# Patient Record
Sex: FEMALE | Race: OTHER
[De-identification: names, ages, dates, MRNs, and addresses within clinical notes are randomized per-mention and may not be internally consistent; named-entity substitution may affect disease eponyms.]

---
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
## 2020-01-07 ENCOUNTER — HOSPITAL ENCOUNTER (EMERGENCY)
Dept: HOSPITAL 61 - ER | Age: 20
Discharge: HOME | End: 2020-01-07
Payer: MEDICAID

## 2020-01-07 VITALS — WEIGHT: 183 LBS | HEIGHT: 64 IN | BODY MASS INDEX: 31.24 KG/M2

## 2020-01-07 VITALS — DIASTOLIC BLOOD PRESSURE: 56 MMHG | SYSTOLIC BLOOD PRESSURE: 117 MMHG

## 2020-01-07 DIAGNOSIS — R07.1: ICD-10-CM

## 2020-01-07 DIAGNOSIS — R11.0: ICD-10-CM

## 2020-01-07 DIAGNOSIS — R51: Primary | ICD-10-CM

## 2020-01-07 LAB
ALBUMIN SERPL-MCNC: 3 G/DL (ref 3.4–5)
ALBUMIN/GLOB SERPL: 0.7 {RATIO} (ref 1–1.7)
ALP SERPL-CCNC: 128 U/L (ref 46–116)
ALT SERPL-CCNC: 19 U/L (ref 14–59)
ANION GAP SERPL CALC-SCNC: 11 MMOL/L (ref 6–14)
APTT PPP: YELLOW S
AST SERPL-CCNC: 18 U/L (ref 15–37)
BACTERIA #/AREA URNS HPF: (no result) /HPF
BASOPHILS # BLD AUTO: 0 X10^3/UL (ref 0–0.2)
BASOPHILS NFR BLD: 0 % (ref 0–3)
BILIRUB SERPL-MCNC: 0.3 MG/DL (ref 0.2–1)
BILIRUB UR QL STRIP: NEGATIVE
BUN SERPL-MCNC: 19 MG/DL (ref 7–20)
BUN/CREAT SERPL: 27 (ref 6–20)
CALCIUM SERPL-MCNC: 9.2 MG/DL (ref 8.5–10.1)
CHLORIDE SERPL-SCNC: 103 MMOL/L (ref 98–107)
CO2 SERPL-SCNC: 24 MMOL/L (ref 21–32)
CREAT SERPL-MCNC: 0.7 MG/DL (ref 0.6–1)
EOSINOPHIL NFR BLD: 0.1 X10^3/UL (ref 0–0.7)
EOSINOPHIL NFR BLD: 1 % (ref 0–3)
ERYTHROCYTE [DISTWIDTH] IN BLOOD BY AUTOMATED COUNT: 15.8 % (ref 11.5–14.5)
FIBRINOGEN PPP-MCNC: CLEAR MG/DL
GFR SERPLBLD BASED ON 1.73 SQ M-ARVRAT: 107.8 ML/MIN
GLOBULIN SER-MCNC: 4.4 G/DL (ref 2.2–3.8)
GLUCOSE SERPL-MCNC: 103 MG/DL (ref 70–99)
HCT VFR BLD CALC: 39.9 % (ref 36–47)
HGB BLD-MCNC: 13.4 G/DL (ref 12–15.5)
LYMPHOCYTES # BLD: 2.6 X10^3/UL (ref 1–4.8)
LYMPHOCYTES NFR BLD AUTO: 31 % (ref 24–48)
MCH RBC QN AUTO: 31 PG (ref 25–35)
MCHC RBC AUTO-ENTMCNC: 34 G/DL (ref 31–37)
MCV RBC AUTO: 91 FL (ref 79–100)
MONO #: 0.5 X10^3/UL (ref 0–1.1)
MONOCYTES NFR BLD: 6 % (ref 0–9)
NEUT #: 5.1 X10^3/UL (ref 1.8–7.7)
NEUTROPHILS NFR BLD AUTO: 61 % (ref 31–73)
NITRITE UR QL STRIP: NEGATIVE
PH UR STRIP: 6.5 [PH]
PLATELET # BLD AUTO: 370 X10^3/UL (ref 140–400)
POTASSIUM SERPL-SCNC: 3.7 MMOL/L (ref 3.5–5.1)
PROT SERPL-MCNC: 7.4 G/DL (ref 6.4–8.2)
PROT UR STRIP-MCNC: NEGATIVE MG/DL
PROTHROMBIN TIME: 12.4 SEC (ref 11.7–14)
RBC # BLD AUTO: 4.4 X10^6/UL (ref 3.5–5.4)
RBC #/AREA URNS HPF: (no result) /HPF (ref 0–2)
SODIUM SERPL-SCNC: 138 MMOL/L (ref 136–145)
SQUAMOUS #/AREA URNS LPF: (no result) /LPF
UROBILINOGEN UR-MCNC: 0.2 MG/DL
WBC # BLD AUTO: 8.3 X10^3/UL (ref 4–11)
WBC #/AREA URNS HPF: (no result) /HPF (ref 0–4)

## 2020-01-07 PROCEDURE — 81001 URINALYSIS AUTO W/SCOPE: CPT

## 2020-01-07 PROCEDURE — 85025 COMPLETE CBC W/AUTO DIFF WBC: CPT

## 2020-01-07 PROCEDURE — 70450 CT HEAD/BRAIN W/O DYE: CPT

## 2020-01-07 PROCEDURE — 93005 ELECTROCARDIOGRAM TRACING: CPT

## 2020-01-07 PROCEDURE — 71045 X-RAY EXAM CHEST 1 VIEW: CPT

## 2020-01-07 PROCEDURE — 85610 PROTHROMBIN TIME: CPT

## 2020-01-07 PROCEDURE — 36415 COLL VENOUS BLD VENIPUNCTURE: CPT

## 2020-01-07 PROCEDURE — 80053 COMPREHEN METABOLIC PANEL: CPT

## 2020-01-07 PROCEDURE — 83880 ASSAY OF NATRIURETIC PEPTIDE: CPT

## 2020-01-07 PROCEDURE — 99285 EMERGENCY DEPT VISIT HI MDM: CPT

## 2020-01-07 PROCEDURE — 96374 THER/PROPH/DIAG INJ IV PUSH: CPT

## 2020-01-07 PROCEDURE — 87086 URINE CULTURE/COLONY COUNT: CPT

## 2020-01-07 PROCEDURE — 96375 TX/PRO/DX INJ NEW DRUG ADDON: CPT

## 2020-01-07 RX ADMIN — ONDANSETRON ONE MG: 2 INJECTION INTRAMUSCULAR; INTRAVENOUS at 05:53

## 2020-01-07 RX ADMIN — KETOROLAC TROMETHAMINE ONE MG: 30 INJECTION, SOLUTION INTRAMUSCULAR at 06:13

## 2020-01-07 RX ADMIN — BACITRACIN SCH MLS/HR: 5000 INJECTION, POWDER, FOR SOLUTION INTRAMUSCULAR at 05:26

## 2020-01-07 NOTE — RAD
AP chest x-ray

 

HISTORY: Chest pain with deep inspiration.

 

FINDINGS: Heart size upper limits of normal may be magnified by the AP 

portable technique. Mediastinal silhouette is normal. The far superior 

lung apices are outside the field-of-view. No pneumothorax, pulmonary 

opacities or pleural effusions. The bones are unremarkable.

 

IMPRESSION: No acute process.

 

Electronically signed by: Ryan Judd MD (1/7/2020 6:08 AM) [facility]

## 2020-01-07 NOTE — EKG
[facility]

              [address] [place] [address]

Test Date:    2020               Test Time:    05:12:07

Pat Name:     SAVNANA COLEMAN    Department:   

Patient ID:   [id number]           Room:          

Gender:       F                        Technician:   

:          2000               Requested By: CLARK GODDARD

Order Number: [id number]           Reading MD:     

                                 Measurements

Intervals                              Axis          

Rate:         69                       P:            44

LA:           122                      QRS:          58

QRSD:         82                       T:            34

QT:           398                                    

QTc:          428                                    

                           Interpretive Statements

SINUS RHYTHM

LEFT ATRIAL ABNORMALITY

INCOMPLETE RIGHT BUNDLE BRANCH BLOCK

ABNORMAL ECG

RI6.01

No previous ECG available for comparison

## 2020-01-07 NOTE — PHYS DOC
Past Medical History


Past Medical History:  Other


Additional Past Medical Histor:  preclampsia


 (CLARK GODDARD MD)


Past Surgical History:  No Surgical History


 (CLARK GODDARD MD)


Alcohol Use:  None


Drug Use:  None


 (CLARK GODDARD MD)





Adult General


Chief Complaint


Chief Complaint:  MULTIPLE COMPLAINTS





HPI


HPI





19-year-old female with delivery on  secondary to preeclampsia, presents to

the emergency department with complaints of headache, chest pain. She states 

pain is worse with deep breath. She states her headache is bitemporal, no visual

disturbance, she has had nausea. States she's tried Motrin over-the-counter 

without improvement. Blood pressure currently 133/68, heart rate 69, afebrile.  

She is currently on no medications at this time. She states given her pain she 

continued to the emergency department for further evaluation. EKG is obtained 0 

512 with no acute abnormality seen, heart rate 69. Nothing is improved her pain.


 (CLARK GODDARD MD)





Review of Systems


Review of Systems





Constitutional: Denies fever or chills []


Respiratory: Denies cough or shortness of breath []


Cardiovascular: No additional information not addressed in HPI []


GI: Denies abdominal pain, + nausea, no vomiting, bloody stools or diarrhea []


: Denies dysuria or hematuria []


Musculoskeletal: Denies back pain or joint pain []


Integument: Denies rash or skin lesions []


Neurologic: + headache, no focal weakness or sensory changes []








All other systems were reviewed and found to be within normal limits, except as 

documented in this note.


 (CLARK GODDARD MD)





Current Medications


Current Medications





Current Medications








 Medications


  (Trade)  Dose


 Ordered  Sig/Franci  Start Time


 Stop Time Status Last Admin


Dose Admin


 


 Diphenhydramine


 HCl


  (Benadryl)  25 mg  1X  ONCE  20 06:00


 20 06:01 DC 20 05:53


25 MG


 


 Ketorolac


 Tromethamine


  (Toradol 30mg


 Vial)  30 mg  1X  ONCE  20 06:30


 20 06:31 DC 20 06:13


30 MG


 


 Ondansetron HCl


  (Zofran)  4 mg  1X  ONCE  20 06:00


 20 06:01 DC 20 05:53


4 MG


 


 Sodium Chloride  1,000 ml @ 


 1,000 mls/hr  Q1H  20 05:30


 20 06:29 DC 20 05:26


1,000 MLS/HR





 (STEPHANY SALAZAR MD)





Allergies


Allergies





Allergies








Coded Allergies Type Severity Reaction Last Updated Verified


 


  No Known Drug Allergies    19 No





 (STEPHANY SALAZAR MD)





Physical Exam


Physical Exam





Constitutional: Well developed, well nourished, no acute distress, non-toxic 

appearance. []


HENT: Normocephalic, atraumatic, bilateral external ears normal, oropharynx 

moist, no oral exudates, nose normal. []


Eyes: PERRLA, EOMI, conjunctiva normal, no discharge. [] 


Cardiovascular:Heart rate regular rhythm, no murmur []


Lungs & Thorax:  Bilateral breath sounds clear to auscultation []


Abdomen: Bowel sounds normal, soft, no tenderness, no masses, no pulsatile 

masses. [] 


Skin: Warm, dry, no erythema, no rash. [] 


Back: No tenderness, no CVA tenderness. [] 


Extremities: No tenderness, no edema. [] 


Neurologic: Alert and oriented X 3, no focal deficits noted. []


Psychologic: Affect normal, judgement normal, mood normal. []


 (CLARK GODDARD MD)





Current Patient Data


Vital Signs





                                   Vital Signs








  Date Time  Temp Pulse Resp B/P (MAP) Pulse Ox O2 Delivery O2 Flow Rate FiO2


 


20 06:09  55 20 125/74 (91) 98 Room Air  


 


20 05:07 98.8       





 98.8       





 (STEPHANY SALAZAR MD)


Lab Values





                                Laboratory Tests








Test


 20


05:15 20


05:20


 


Urine Color Yellow   


 


Urine Clarity Clear   


 


Urine pH 6.5   


 


Urine Specific Gravity 1.015   


 


Urine Protein


 Negative mg/dL


(NEG-TRACE) 





 


Urine Glucose (UA)


 Negative mg/dL


(NEG) 





 


Urine Ketones (Stick)


 Negative mg/dL


(NEG) 





 


Urine Blood Large (NEG)   


 


Urine Nitrite


 Negative (NEG)


 





 


Urine Bilirubin


 Negative (NEG)


 





 


Urine Urobilinogen Dipstick


 0.2 mg/dL (0.2


mg/dL) 





 


Urine Leukocyte Esterase Large (NEG)   


 


Urine RBC


 6-10 /HPF


(0-2) 





 


Urine WBC


 5-10 /HPF


(0-4) 





 


Urine Squamous Epithelial


Cells None /LPF  


 





 


Urine Bacteria


 Few /HPF


(0-FEW) 





 


White Blood Count


 


 8.3 x10^3/uL


(4.0-11.0)


 


Red Blood Count


 


 4.40 x10^6/uL


(3.50-5.40)


 


Hemoglobin


 


 13.4 g/dL


(12.0-15.5)


 


Hematocrit


 


 39.9 %


(36.0-47.0)


 


Mean Corpuscular Volume


 


 91 fL ()





 


Mean Corpuscular Hemoglobin  31 pg (25-35)  


 


Mean Corpuscular Hemoglobin


Concent 


 34 g/dL


(31-37)


 


Red Cell Distribution Width


 


 15.8 %


(11.5-14.5)  H


 


Platelet Count


 


 370 x10^3/uL


(140-400)


 


Neutrophils (%) (Auto)  61 % (31-73)  


 


Lymphocytes (%) (Auto)  31 % (24-48)  


 


Monocytes (%) (Auto)  6 % (0-9)  


 


Eosinophils (%) (Auto)  1 % (0-3)  


 


Basophils (%) (Auto)  0 % (0-3)  


 


Neutrophils # (Auto)


 


 5.1 x10^3/uL


(1.8-7.7)


 


Lymphocytes # (Auto)


 


 2.6 x10^3/uL


(1.0-4.8)


 


Monocytes # (Auto)


 


 0.5 x10^3/uL


(0.0-1.1)


 


Eosinophils # (Auto)


 


 0.1 x10^3/uL


(0.0-0.7)


 


Basophils # (Auto)


 


 0.0 x10^3/uL


(0.0-0.2)


 


Prothrombin Time


 


 12.4 SEC


(11.7-14.0)


 


Prothrombin Time INR  1.0 (0.8-1.1)  


 


Sodium Level


 


 138 mmol/L


(136-145)


 


Potassium Level


 


 3.7 mmol/L


(3.5-5.1)


 


Chloride Level


 


 103 mmol/L


()


 


Carbon Dioxide Level


 


 24 mmol/L


(21-32)


 


Anion Gap  11 (6-14)  


 


Blood Urea Nitrogen


 


 19 mg/dL


(7-20)


 


Creatinine


 


 0.7 mg/dL


(0.6-1.0)


 


Estimated GFR


(Cockcroft-Gault) 


 107.8  





 


BUN/Creatinine Ratio  27 (6-20)  H


 


Glucose Level


 


 103 mg/dL


(70-99)  H


 


Calcium Level


 


 9.2 mg/dL


(8.5-10.1)


 


Total Bilirubin


 


 0.3 mg/dL


(0.2-1.0)


 


Aspartate Amino Transferase


(AST) 


 18 U/L (15-37)





 


Alanine Aminotransferase (ALT)


 


 19 U/L (14-59)





 


Alkaline Phosphatase


 


 128 U/L


()  H


 


NT-Pro-B-Type Natriuretic


Peptide 


 19 pg/mL


(0-124)


 


Total Protein


 


 7.4 g/dL


(6.4-8.2)


 


Albumin


 


 3.0 g/dL


(3.4-5.0)  L


 


Albumin/Globulin Ratio


 


 0.7 (1.0-1.7)


L





                                Laboratory Tests


20 05:20








                                Laboratory Tests


20 05:20








 (STEPHANY SALAZAR MD)


Lab Values





                                Laboratory Tests








Test


 20


05:20


 


White Blood Count


 8.3 x10^3/uL


(4.0-11.0)


 


Red Blood Count


 4.40 x10^6/uL


(3.50-5.40)


 


Hemoglobin


 13.4 g/dL


(12.0-15.5)


 


Hematocrit


 39.9 %


(36.0-47.0)


 


Mean Corpuscular Volume


 91 fL ()





 


Mean Corpuscular Hemoglobin 31 pg (25-35)  


 


Mean Corpuscular Hemoglobin


Concent 34 g/dL


(31-37)


 


Red Cell Distribution Width


 15.8 %


(11.5-14.5)  H


 


Platelet Count


 370 x10^3/uL


(140-400)


 


Neutrophils (%) (Auto) 61 % (31-73)  


 


Lymphocytes (%) (Auto) 31 % (24-48)  


 


Monocytes (%) (Auto) 6 % (0-9)  


 


Eosinophils (%) (Auto) 1 % (0-3)  


 


Basophils (%) (Auto) 0 % (0-3)  


 


Neutrophils # (Auto)


 5.1 x10^3/uL


(1.8-7.7)


 


Lymphocytes # (Auto)


 2.6 x10^3/uL


(1.0-4.8)


 


Monocytes # (Auto)


 0.5 x10^3/uL


(0.0-1.1)


 


Eosinophils # (Auto)


 0.1 x10^3/uL


(0.0-0.7)


 


Basophils # (Auto)


 0.0 x10^3/uL


(0.0-0.2)


 


Sodium Level


 138 mmol/L


(136-145)


 


Potassium Level


 3.7 mmol/L


(3.5-5.1)


 


Chloride Level


 103 mmol/L


()


 


Carbon Dioxide Level


 24 mmol/L


(21-32)


 


Anion Gap 11 (6-14)  


 


Blood Urea Nitrogen


 19 mg/dL


(7-20)


 


Creatinine


 0.7 mg/dL


(0.6-1.0)


 


Estimated GFR


(Cockcroft-Gault) 107.8  





 


BUN/Creatinine Ratio 27 (6-20)  H


 


Glucose Level


 103 mg/dL


(70-99)  H


 


Calcium Level


 9.2 mg/dL


(8.5-10.1)


 


Total Bilirubin


 0.3 mg/dL


(0.2-1.0)


 


Aspartate Amino Transferase


(AST) 18 U/L (15-37)





 


Alanine Aminotransferase (ALT)


 19 U/L (14-59)





 


Alkaline Phosphatase


 128 U/L


()  H


 


NT-Pro-B-Type Natriuretic


Peptide 19 pg/mL


(0-124)


 


Total Protein


 7.4 g/dL


(6.4-8.2)


 


Albumin


 3.0 g/dL


(3.4-5.0)  L


 


Albumin/Globulin Ratio


 0.7 (1.0-1.7)


L





                                Laboratory Tests


20 05:20








                                Laboratory Tests


20 05:20








 (CLARK GODDARD MD)





EKG


EKG


EKG reviewed 0512, nml axis, hear rate 69 no evidence of stemi appreciated[]


 (CLARK GODDARD MD)





Radiology/Procedures


Radiology/Procedures


[]


 (CLARK GODDARD MD)


Radiology/Procedures


[facility]


                    [address] [place] [address]


                                 [phone]


                                        


                                 IMAGING REPORT





                                     Signed





PATIENT: SAVANNA COLEMANACCOUNT: [id number]     MRN#: [id number]


: 2000           LOCATION: ER              AGE: 19


SEX: F                    EXAM DT: 20         ACCESSION#: [id number]


STATUS: REG ER            ORD. PHYSICIAN: CLARK GODDARD MD


REASON: pain with deep breath


PROCEDURE: CHEST AP ONLY





AP chest x-ray


 


HISTORY: Chest pain with deep inspiration.


 


FINDINGS: Heart size upper limits of normal may be magnified by the AP 


portable technique. Mediastinal silhouette is normal. The far superior 


lung apices are outside the field-of-view. No pneumothorax, pulmonary 


opacities or pleural effusions. The bones are unremarkable.


 


IMPRESSION: No acute process.


 


Electronically signed by: Sincere Judd MD (2020 6:08 AM) [email or web address]














DICTATED and SIGNED BY:     SINCERE JUDD MD


DATE:     20 0608


                            [facility]


                    [address] [place] [address]


                                 [phone]


                                        


                                 IMAGING REPORT





                                     Signed





PATIENT: SAVANNA COLEMANACCOUNT: [id number]     MRN#: [id number]


: 2000           LOCATION: ER              AGE: 19


SEX: F                    EXAM DT: 20         ACCESSION#: [id number]


STATUS: REG ER            ORD. PHYSICIAN: CLARK GODDARD MD


REASON: headache, history or preeclampsia


PROCEDURE: CT HEAD WO CONTRAST





CT head without contrast


 


PQRS statement: CT scans at this facility use dose reduction including 


either automated exposure control, iterative reconstructions, and /or 


weight based radiation dosing via mA and kV modification when appropriate 


to reduce radiation dose to as low as reasonably achievable.


 


HISTORY: Headache.


 


FINDINGS: No intracranial hemorrhage, mass, hydrocephalus, extra-axial 


fluid collections or infarction. No acute ischemic changes evident. 


Orbits, mastoids and bones are unremarkable.


 


IMPRESSION: Normal exam.


 


Electronically signed by: Sincere Judd MD (2020 6:14 AM) [email or web address]














DICTATED and SIGNED BY:     SINCERE JUDD MD


DATE:     20 0614





 (STEPHANY SALAZAR MD)





Course & Med Decision Making


Course & Med Decision Making


Pertinent Labs and Imaging studies reviewed. (See chart for details)





[]19-year-old female with delivery on  secondary to preeclampsia, presents 

to the emergency department with complaints of headache, chest pain. She states 

pain is worse with deep breath. She states her headache is bitemporal, no visual

 disturbance, she has had nausea. States she's tried Motrin over-the-counter 

without improvement. Blood pressure currently 133/68, heart rate 69, afebrile.  

She is currently on no medications at this time. She states given her pain she 

continued to the emergency department for further evaluation. EKG is obtained 0 

512 with no acute abnormality seen, heart rate 69. Nothing is improved her pain.








Patient signed out to oncoming colleague at 0600.  Discussed case with DR. SALAZAR - she will follow up patient with plans for disposition


 (CLARK GODDARD MD)


Course & Med Decision Making


Patient's care transferred to me at 0600. Patient is sleeping without problem. 

Blood pressure was 124/64. Labs and CT head and chest x-ray was unremarkable. 

Patient was advised to increase fluid intake and follow up with her OB/GYN.





I've spoken with the patient and/or caregivers. I've explained the patient's 

condition, diagnosis and treatment plan based on information available to me at 

this time. I've answered the patient's and/or caregivers questions and addressed

 any concerns. The patient and/or caregivers have a good understanding the 

patient's diagnosis, condition and treatment plan as can be expected at this 

point. Vital signs have been stabilized. The patient's condition is stable for 

discharge from the emergency department.





The patient will pursue further outpatient evaluation with her primary care 

provider or other designated consulting physician as outlined in the discharge 

instructions. Patient and/or caregivers are agreeable to this plan of care and 

follow-up instructions have been explained in detail. The patient and/or 

caregivers have received these instructions in written format and expressed 

understanding of these discharge instructions. The patient and her caregivers 

are aware that if any significant change in condition or worsening of symptoms 

should prompt him to immediately return to this of the closest emergency 

department.  If an emergent department is not readily available I would 

encourage him to call 911.


 (STEPHANY SALAZAR MD)


Dragon Disclaimer


Dragon Disclaimer


This electronic medical record was generated, in whole or in part, using a voice

 recognition dictation system.


 (CLARK GODDARD MD)





Departure


Departure


Impression:  


   Primary Impression:  


   Headache


Disposition:   HOME, SELF-CARE (at 071)


Condition:  IMPROVED


Referrals:  


NO PCP (PCP)


Patient Instructions:  Headache, FAQs





Additional Instructions:  


Drink plenty of liquids


Follow-up with your primary care and OB/GYN physician in 2-3 days


Return to ER if not getting better





Thank you for visiting [facility]. We appreciate you trusting us 

with your care. If any additional problems come up don't hesitate to return to 

visit us. Please follow up with your primary care provider so they can plan 

additional care if needed and know about the problem that you had. If symptoms 

worsen come back to the Emergency Department. Any concerning symptoms that start

 such as chest pain, shortness of air, weakness or numbness on one side of the 

body, running high fevers or any other concerning symptoms return to the ER.


Scripts


Ondansetron Hcl (ZOFRAN) 4 Mg Tablet


1 TAB PO PRN Q6-8HRS for nausea, #12 TAB


   Prov: STEPHANY SALAZAR MD         20 


Hydrocodone/Apap 5-325 (NORCO 5-325 TABLET) 1 Each Tablet


1 TAB PO PRN Q6HRS PRN for PAIN, #10 TAB 0 Refills


   Prov: STEPHANY SALAZAR MD         20





Problem Qualifiers








   Primary Impression:  


   Headache


   Headache type:  unspecified  Headache chronicity pattern:  unspecified 

   pattern  Intractability:  not intractable  Qualified Codes:  R51 - Headache








CLARK GODDARD MD               2020 05:37


STEPHANY SALAZAR MD              2020 07:04

## 2020-01-07 NOTE — RAD
CT head without contrast

 

PQRS statement: CT scans at this facility use dose reduction including 

either automated exposure control, iterative reconstructions, and /or 

weight based radiation dosing via mA and kV modification when appropriate 

to reduce radiation dose to as low as reasonably achievable.

 

HISTORY: Headache.

 

FINDINGS: No intracranial hemorrhage, mass, hydrocephalus, extra-axial 

fluid collections or infarction. No acute ischemic changes evident. 

Orbits, mastoids and bones are unremarkable.

 

IMPRESSION: Normal exam.

 

Electronically signed by: Ryan Judd MD (1/7/2020 6:14 AM) [facility]